# Patient Record
Sex: MALE | Race: BLACK OR AFRICAN AMERICAN | Employment: FULL TIME | URBAN - METROPOLITAN AREA
[De-identification: names, ages, dates, MRNs, and addresses within clinical notes are randomized per-mention and may not be internally consistent; named-entity substitution may affect disease eponyms.]

---

## 2022-06-22 ENCOUNTER — APPOINTMENT (OUTPATIENT)
Dept: GENERAL RADIOLOGY | Age: 67
End: 2022-06-22
Payer: COMMERCIAL

## 2022-06-22 ENCOUNTER — APPOINTMENT (OUTPATIENT)
Dept: CT IMAGING | Age: 67
End: 2022-06-22
Payer: COMMERCIAL

## 2022-06-22 ENCOUNTER — HOSPITAL ENCOUNTER (EMERGENCY)
Age: 67
Discharge: HOME | End: 2022-06-22
Payer: COMMERCIAL

## 2022-06-22 VITALS
TEMPERATURE: 99.2 F | WEIGHT: 268 LBS | SYSTOLIC BLOOD PRESSURE: 125 MMHG | RESPIRATION RATE: 22 BRPM | OXYGEN SATURATION: 92 % | DIASTOLIC BLOOD PRESSURE: 89 MMHG | HEART RATE: 80 BPM

## 2022-06-22 DIAGNOSIS — R55 SYNCOPE AND COLLAPSE: Primary | ICD-10-CM

## 2022-06-22 LAB
A/G RATIO: 1.2 (ref 1.1–2.2)
ALBUMIN SERPL-MCNC: 4.3 G/DL (ref 3.4–5)
ALP BLD-CCNC: 67 U/L (ref 40–129)
ALT SERPL-CCNC: 21 U/L (ref 10–40)
ANION GAP SERPL CALCULATED.3IONS-SCNC: 12 MMOL/L (ref 3–16)
AST SERPL-CCNC: 18 U/L (ref 15–37)
BASOPHILS ABSOLUTE: 0.1 K/UL (ref 0–0.2)
BASOPHILS RELATIVE PERCENT: 1 %
BILIRUB SERPL-MCNC: 0.8 MG/DL (ref 0–1)
BUN BLDV-MCNC: 13 MG/DL (ref 7–20)
CALCIUM SERPL-MCNC: 9.4 MG/DL (ref 8.3–10.6)
CHLORIDE BLD-SCNC: 103 MMOL/L (ref 99–110)
CO2: 22 MMOL/L (ref 21–32)
CREAT SERPL-MCNC: 1.2 MG/DL (ref 0.8–1.3)
EKG ATRIAL RATE: 83 BPM
EKG DIAGNOSIS: NORMAL
EKG P AXIS: 35 DEGREES
EKG P-R INTERVAL: 214 MS
EKG Q-T INTERVAL: 352 MS
EKG QRS DURATION: 86 MS
EKG QTC CALCULATION (BAZETT): 413 MS
EKG R AXIS: -53 DEGREES
EKG T AXIS: -11 DEGREES
EKG VENTRICULAR RATE: 83 BPM
EOSINOPHILS ABSOLUTE: 0.2 K/UL (ref 0–0.6)
EOSINOPHILS RELATIVE PERCENT: 2 %
GFR AFRICAN AMERICAN: >60
GFR NON-AFRICAN AMERICAN: >60
GLUCOSE BLD-MCNC: 115 MG/DL (ref 70–99)
HCT VFR BLD CALC: 49.4 % (ref 40.5–52.5)
HEMOGLOBIN: 15.6 G/DL (ref 13.5–17.5)
LYMPHOCYTES ABSOLUTE: 1.8 K/UL (ref 1–5.1)
LYMPHOCYTES RELATIVE PERCENT: 21.9 %
MCH RBC QN AUTO: 26.7 PG (ref 26–34)
MCHC RBC AUTO-ENTMCNC: 31.6 G/DL (ref 31–36)
MCV RBC AUTO: 84.4 FL (ref 80–100)
MONOCYTES ABSOLUTE: 1.4 K/UL (ref 0–1.3)
MONOCYTES RELATIVE PERCENT: 17.9 %
NEUTROPHILS ABSOLUTE: 4.6 K/UL (ref 1.7–7.7)
NEUTROPHILS RELATIVE PERCENT: 57.2 %
PDW BLD-RTO: 15.9 % (ref 12.4–15.4)
PLATELET # BLD: 106 K/UL (ref 135–450)
PLATELET SLIDE REVIEW: ABNORMAL
PMV BLD AUTO: 9.4 FL (ref 5–10.5)
POTASSIUM REFLEX MAGNESIUM: 4 MMOL/L (ref 3.5–5.1)
PRO-BNP: 23 PG/ML (ref 0–124)
RBC # BLD: 5.86 M/UL (ref 4.2–5.9)
SLIDE REVIEW: ABNORMAL
SODIUM BLD-SCNC: 137 MMOL/L (ref 136–145)
TOTAL PROTEIN: 8 G/DL (ref 6.4–8.2)
TROPONIN: <0.01 NG/ML
WBC # BLD: 8 K/UL (ref 4–11)

## 2022-06-22 PROCEDURE — 80053 COMPREHEN METABOLIC PANEL: CPT

## 2022-06-22 PROCEDURE — 85025 COMPLETE CBC W/AUTO DIFF WBC: CPT

## 2022-06-22 PROCEDURE — 71045 X-RAY EXAM CHEST 1 VIEW: CPT

## 2022-06-22 PROCEDURE — 99285 EMERGENCY DEPT VISIT HI MDM: CPT

## 2022-06-22 PROCEDURE — 84484 ASSAY OF TROPONIN QUANT: CPT

## 2022-06-22 PROCEDURE — 83880 ASSAY OF NATRIURETIC PEPTIDE: CPT

## 2022-06-22 PROCEDURE — 93010 ELECTROCARDIOGRAM REPORT: CPT | Performed by: INTERNAL MEDICINE

## 2022-06-22 PROCEDURE — 93005 ELECTROCARDIOGRAM TRACING: CPT | Performed by: EMERGENCY MEDICINE

## 2022-06-22 PROCEDURE — 71250 CT THORAX DX C-: CPT

## 2022-06-22 ASSESSMENT — PAIN - FUNCTIONAL ASSESSMENT: PAIN_FUNCTIONAL_ASSESSMENT: NONE - DENIES PAIN

## 2022-06-22 NOTE — ED TRIAGE NOTES
Unable to verify medication list in triage/ \"I take a blood thinner and medicine for my blood pressure\"

## 2022-06-22 NOTE — ED NOTES
Discharge and education instructions reviewed. Patient verbalized understanding, teach-back successful. Patient denied questions at this time. No acute distress noted. Patient instructed to follow-up as noted - return to emergency department if symptoms worsen. Patient verbalized understanding. Discharged per EDMD with discharged instructions. PIV removed.         Karyna Evans RN  06/22/22 [id number]

## 2022-06-24 NOTE — ED PROVIDER NOTES
MARISA. I have evaluated this patient. My supervising physician was available for consultation. Chief Complaint:   Chief Complaint   Patient presents with    Loss of Consciousness     Pt report sncopal episode this morning from standing position, family witnessed him and sat him to floor. Denies CP. Alert and oriented times 4        ED Course & Medical Decision Making  74 y.o. male presenting with syncope. -CBC/chem: not concerning   -BNP: 23  -Trop: negative.     -Chest xr: concern for pneumonitis  -CT chest: Nodule, atelectasis, no pneumonia     -EKst av block. MDM: 72-year-old male here with syncope. Work-up is negative for acute pathology. Nodule noted on CT scan. I recommended admission, the patient declined admission due to being from out of town, as well as having this happen several times in the past.  I discussed the risks and benefits of this with him, and his spouse. They both understand, and feel as though they are willing to accept the risk related to discharge, and would prefer to follow-up in [place] closer to home. He has established care at a cardiologist, as well as a primary care physician. I discussed following up about the pulmonary nodule as well. CT chest performed due to initial read of portable chest x-ray as pneumonitis. Final Clinical Impression & Plan:  Syncope  - f/u with primary care, cardiology  - ED return precautions given and reviewed, questions answered. ---------------------------------------------------------------------------------------------------------------  HPI:  PMH significant for syncope, DM, HTN    Presenting with syncope. Patient was visiting from [place], and was outside with family. He stood up, and felt hot and weak. He sat in a chair, and then had a brief syncopal episode. He has had several of these over the past few months. He is followed by his primary care provider, as well as cardiology.     In the emergency department he reports no chest pain, no shortness of breath, no belly pain. He reports feeling in his usual state of good health. No head injury during fall. Is this patient to be included in the SEP-1 Core Measure due to severe sepsis or septic shock? No   Exclusion criteria - the patient is NOT to be included for SEP-1 Core Measure due to: Infection is not suspected      Medical Hx: Past medical history reviewed, and pertinent for:     Past Medical History:   Diagnosis Date    Diabetes mellitus ([facility] [address]     Hyperlipidemia     Hypertension        There is no problem list on file for this patient. Surgical Hx:   Past surgical history reviewed, and pertinent for:      Past Surgical History:   Procedure Laterality Date    CORONARY ANGIOPLASTY WITH STENT PLACEMENT         Social Hx:       Social History     Socioeconomic History    Marital status:      Spouse name: Not on file    Number of children: Not on file    Years of education: Not on file    Highest education level: Not on file   Occupational History    Not on file   Tobacco Use    Smoking status: Never Smoker    Smokeless tobacco: Never Used   Substance and Sexual Activity    Alcohol use: Not Currently    Drug use: Not on file    Sexual activity: Not on file   Other Topics Concern    Not on file   Social History Narrative    Not on file     Social Determinants of Health     Financial Resource Strain:     Difficulty of Paying Living Expenses: Not on file   Food Insecurity:     Worried About Running Out of Food in the Last Year: Not on file    Diane of Food in the Last Year: Not on file   Transportation Needs:     Lack of Transportation (Medical): Not on file    Lack of Transportation (Non-Medical):  Not on file   Physical Activity:     Days of Exercise per Week: Not on file    Minutes of Exercise per Session: Not on file   Stress:     Feeling of Stress : Not on file   Social Connections:     Frequency of diffuse   bronchitis/pneumonitis. No large areas of pulmonary consolidation or   detectable pleural effusions.              Labs:  Results for orders placed or performed during the hospital encounter of 06/22/22   CBC with Auto Differential   Result Value Ref Range    WBC 8.0 4.0 - 11.0 K/uL    RBC 5.86 4.20 - 5.90 M/uL    Hemoglobin 15.6 13.5 - 17.5 g/dL    Hematocrit 49.4 40.5 - 52.5 %    MCV 84.4 80.0 - 100.0 fL    MCH 26.7 26.0 - 34.0 pg    MCHC 31.6 31.0 - 36.0 g/dL    RDW 15.9 (H) 12.4 - 15.4 %    Platelets 243 (L) 341 - 450 K/uL    MPV 9.4 5.0 - 10.5 fL    PLATELET SLIDE REVIEW Decreased     SLIDE REVIEW see below     Neutrophils % 57.2 %    Lymphocytes % 21.9 %    Monocytes % 17.9 %    Eosinophils % 2.0 %    Basophils % 1.0 %    Neutrophils Absolute 4.6 1.7 - 7.7 K/uL    Lymphocytes Absolute 1.8 1.0 - 5.1 K/uL    Monocytes Absolute 1.4 (H) 0.0 - 1.3 K/uL    Eosinophils Absolute 0.2 0.0 - 0.6 K/uL    Basophils Absolute 0.1 0.0 - 0.2 K/uL   Comprehensive Metabolic Panel w/ Reflex to MG   Result Value Ref Range    Sodium 137 136 - 145 mmol/L    Potassium reflex Magnesium 4.0 3.5 - 5.1 mmol/L    Chloride 103 99 - 110 mmol/L    CO2 22 21 - 32 mmol/L    Anion Gap 12 3 - 16    Glucose 115 (H) 70 - 99 mg/dL    BUN 13 7 - 20 mg/dL    CREATININE 1.2 0.8 - 1.3 mg/dL    GFR Non-African American >60 >60    GFR African American >60 >60    Calcium 9.4 8.3 - 10.6 mg/dL    Total Protein 8.0 6.4 - 8.2 g/dL    Albumin 4.3 3.4 - 5.0 g/dL    Albumin/Globulin Ratio 1.2 1.1 - 2.2    Total Bilirubin 0.8 0.0 - 1.0 mg/dL    Alkaline Phosphatase 67 40 - 129 U/L    ALT 21 10 - 40 U/L    AST 18 15 - 37 U/L   Brain Natriuretic Peptide   Result Value Ref Range    Pro-BNP 23 0 - 124 pg/mL   Troponin   Result Value Ref Range    Troponin <0.01 <0.01 ng/mL   EKG 12 Lead   Result Value Ref Range    Ventricular Rate 83 BPM    Atrial Rate 83 BPM    P-R Interval 214 ms    QRS Duration 86 ms    Q-T Interval 352 ms    QTc Calculation (Bazett) 413 ms    P Axis 35 degrees    R Axis -53 degrees    T Axis -11 degrees    Diagnosis       Sinus rhythm with 1st degree A-V blockLeft axis deviationInferior infarct , age undeterminedCannot rule out Anterior infarct , age undeterminedConfirmed by Attila Medina ([id number]) on 6/22/2022 4:52:45 PM       Screenings:     [place] Coma Scale  Eye Opening: Spontaneous  Best Verbal Response: Oriented  Best Motor Response: Obeys commands  [place] Coma Scale Score: 15              Physical Exam:  Vitals: /89   Pulse 80   Temp 99.2 °F (37.3 °C) (Oral)   Resp 22   Wt 268 lb (121.6 kg)   SpO2 92%    General: awake, alert, no apparent distress  Pupils: equal, reactive  Eyes: EOM intact, conjunctiva clear, no discharge  Head: Non-traumatic  Neck: Supple  Mouth: Moist, no oral lesions, no tonsillar enlargement  Heart: Rate as noted, regular rhythm, no murmur or rubs. Chest/Lungs: CTAB, no wheezes or crackles  Abdomen: soft, nondistended, no tenderness to palpation   Back: No midline tenderness. No CVA tenderness  Extremities:  2+ distal pulses bilateral UE and LE, no tenderness of calves, no edema  Neuro: Moving all extremities, no facial droop, no slurred speech, answers questions appropriately. Cranial nerves II to XII intact. Normal finger-to-nose. SILT. 5 out of 5 strength bilateral upper and lower extremities  Skin: Warm.  No visible rash, lesions, or bruising           KARL Dempsey, [id number]  07/01/22 [id number]